# Patient Record
(demographics unavailable — no encounter records)

---

## 2024-12-19 NOTE — DISCUSSION/SUMMARY
[de-identified] : Chief complaint: Left knee pain  HPI: Patient is a 23-year-old male who presents the office today for the evaluation of left knee pain which manifested approximately 2 months ago.  Patient reports that he was warming up for a 5K.  While running he felt a pop in the left knee followed by pain.  Patient reports that he had ongoing pain so he stopped his strenuous activities for approximately 1.5 weeks.  He later went back to gym activities however again experienced pain to the left knee.  Since that time he reports doing minimal lower body exercises.  He does report that he recently went snowboarding without any significant discomfort to the left knee.  Patient reports that he works as a  and walks more than 10,000 steps per day and is on his feet for extended periods of time.  He still gets pain to the left knee with certain activities.  He reports approximately 85% improvement in his left knee pain when compared with initial onset.  No reported previous surgical intervention to the left knee.  ROS: Positive for left knee pain  Physical examination of the left knee:  No appreciable erythema No edema No ecchymosis Skin is intact Patient is able to perform active straight leg raise Active range of motion of the left knee from 0 to approximately 120 degrees There is no appreciable tenderness to palpation over the diffuse knee There is no appreciable laxity with valgus or varus stress testing No appreciable tibial translation with anterior posterior drawer Negative Jose C's Left calf is soft and nontender  Three-view x-rays of the left knee performed in the office today show no obvious acute displaced fracture, subluxation, or dislocation  Assessment/plan: Acute pain of the left knee, discussed treatment options with the patient  1.  Discussed the possibility of formal physical therapy with the patient however he kindly deferred, patient was provided with a copy of the [facility] knee conditioning program so that he can get started on this at home 2.  Patient can take over-the-counter medication on an as-needed basis for pain 3.  Discussed activity modifications with the patient, he can wear a neoprene knee sleeve versus Ace wrap to the left knee on an as-needed basis for comfort and support while active, I recommend that he remove this for resting, sleeping, and showering/hygiene 4.  Patient can apply ice or heat to the left knee on an as-needed basis with sensory precautions  Patient will be provided with a 6-week follow-up with me for repeat evaluation, he verbalized understanding of all findings in the office today, agrees to follow-up as directed